# Patient Record
(demographics unavailable — no encounter records)

---

## 2025-01-14 NOTE — HISTORY OF PRESENT ILLNESS
[Cold Symptoms] : cold symptoms [Severe] : severe [___ Days ago] : [unfilled] days ago [Sudden] : suddenly [Constant] : constant [Congestion] : congestion [Cough] : cough [Sore Throat] : sore throat [Chills] : chills [Fatigue] : fatigue [Headache] : headache [Fever] : fever [OTC Remedies] : OTC remedies [At Night] : at night [In Morning] : in the morning [Worsening] : worsening [Wheezing] : no wheezing [Anorexia] : no anorexia [Shortness Of Breath] : no shortness of breath [Earache] : no earache [FreeTextEntry8] : Ms. Aleshia Lindsey is a 41 yo female presents today for symptoms of chest congestion. cold, sore throat, headache, nasal pressure, dry cough for the last 3 days. Pt had gone to Urgent care this past saturday check for covid, and strep were both negative. Pt reports even last week had similar symptoms which improving, then then suddenly started to worsen over the weekend. 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

## 2025-01-14 NOTE — PHYSICAL EXAM
[Ill-Appearing] : ill-appearing [EOMI] : extraocular movements intact [Supple] : supple [Normal S1, S2] : normal S1 and S2 [No Edema] : there was no peripheral edema [Soft] : abdomen soft [Non Tender] : non-tender [No Rash] : no rash [Normal Gait] : normal gait [Normal Affect] : the affect was normal [de-identified] : erythematous, edematous posterior oropharynx [de-identified] : positive lymph nodes b/l [de-identified] : fine crackle b/l L>R. no wheezing, no rales, no rhonchi

## 2025-01-14 NOTE — ASSESSMENT
[FreeTextEntry1] : Recommend supportive care including antipyretics, fluids, OTC cough/cold medications if age-appropriate, and nasal saline followed by nasal suction. Return if symptoms worsen or persist. Augmentin BID for 7 days Lidocaine viscous for sore throat promethazine DM if symptoms fail to improve or worsening return for reevaluation.

## 2025-03-03 NOTE — REVIEW OF SYSTEMS
[Fever] : fever [Chills] : chills [Fatigue] : fatigue [Night Sweats] : night sweats [Nosebleed] : nosebleed [Hoarseness] : hoarseness [Nasal Discharge] : nasal discharge [Sore Throat] : sore throat [Postnasal Drip] : postnasal drip [Negative] : Heme/Lymph [Earache] : no earache [Hearing Loss] : no hearing loss

## 2025-03-03 NOTE — PHYSICAL EXAM
[No Acute Distress] : no acute distress [Ill-Appearing] : ill-appearing [Normal Sclera/Conjunctiva] : normal sclera/conjunctiva [Normal Outer Ear/Nose] : the outer ears and nose were normal in appearance [No JVD] : no jugular venous distention [No Respiratory Distress] : no respiratory distress  [No Edema] : there was no peripheral edema [Deep Tendon Reflexes (DTR)] : deep tendon reflexes were 2+ and symmetric [Alert and Oriented x3] : oriented to person, place, and time [Normal] : affect was normal and insight and judgment were intact [de-identified] : conjested  [de-identified] : + pharynx erythema

## 2025-03-03 NOTE — HISTORY OF PRESENT ILLNESS
[Cold Symptoms] : cold symptoms [Moderate] : moderate [___ Days ago] : [unfilled] days ago [Constant] : constant [Congestion] : congestion [Cough] : cough [Sore Throat] : sore throat [Chills] : chills [Anorexia] : anorexia [Fatigue] : fatigue [Headache] : headache [Fever] : fever [OTC Remedies] : OTC remedies [In Morning] : in the morning [Worsening] : worsening [Wheezing] : no wheezing [Shortness Of Breath] : no shortness of breath [Earache] : no earache [FreeTextEntry8] : Pt is sick for the past 10 weeks 3 rd time .She denies CP,SOB,MEADOWS,She is a pharmacist

## 2025-03-03 NOTE — PHYSICAL EXAM
[No Acute Distress] : no acute distress [Ill-Appearing] : ill-appearing [Normal Sclera/Conjunctiva] : normal sclera/conjunctiva [Normal Outer Ear/Nose] : the outer ears and nose were normal in appearance [No JVD] : no jugular venous distention [No Respiratory Distress] : no respiratory distress  [No Edema] : there was no peripheral edema [Deep Tendon Reflexes (DTR)] : deep tendon reflexes were 2+ and symmetric [Alert and Oriented x3] : oriented to person, place, and time [Normal] : affect was normal and insight and judgment were intact [de-identified] : conjested  [de-identified] : + pharynx erythema